# Patient Record
Sex: MALE | Race: WHITE | NOT HISPANIC OR LATINO
[De-identification: names, ages, dates, MRNs, and addresses within clinical notes are randomized per-mention and may not be internally consistent; named-entity substitution may affect disease eponyms.]

---
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
## 2022-08-23 ENCOUNTER — APPOINTMENT (OUTPATIENT)
Dept: ORTHOPEDIC SURGERY | Facility: CLINIC | Age: 22
End: 2022-08-23

## 2022-08-23 VITALS — BODY MASS INDEX: 25.06 KG/M2 | WEIGHT: 185 LBS | HEIGHT: 72 IN

## 2022-08-23 DIAGNOSIS — S83.512A SPRAIN OF ANTERIOR CRUCIATE LIGAMENT OF LEFT KNEE, INITIAL ENCOUNTER: ICD-10-CM

## 2022-08-23 DIAGNOSIS — Z78.9 OTHER SPECIFIED HEALTH STATUS: ICD-10-CM

## 2022-08-23 PROCEDURE — 73610 X-RAY EXAM OF ANKLE: CPT | Mod: LT

## 2022-08-23 PROCEDURE — 99204 OFFICE O/P NEW MOD 45 MIN: CPT

## 2022-08-23 NOTE — ASSESSMENT
[FreeTextEntry1] : m[facility] from [facility] shows chronci sprains and ol avulsion injury off talus\par \par ossicle dorsal to taus [id number] to ankle impinge\par \par \par - The patient was advised of the diagnosis.  The natural history of the pathology was explained to the patient in layman's terms.  Several different treatment options were discussed and explained including the risks and benefits of both surgical and non-surgical treatments.  All questions and concerns were answered.\par - We will continue conservative treatment with PT, icing, and anti-inflammatory medications.\par - fu Fidelia discuss poss surgery if not better (leaving for college for lax)\par \par \par Medication Discussion:\par 1) We discussed a comprehensive treatment plan that included possible pharmaceutical management involving the use of prescription strength medications including but not limited to options such as oral Naprosyn 500mg BID, once daily Meloxicam 15 mg, or 500-650 mg Tylenol versus over the counter oral medications in addition to discussing possible topical prescription Pennsaid vs  Voltaren gel.\par 2) There is a moderate risk of morbidity with further treatment, especially from use of prescription strength medications and possible side effects of these medications which include but are not limited to upset stomach with oral medications, skin reactions to topical medications and GI/cardiac/renal issues with long term use.\par 3) I recommended that the patient follow-up with their medical physician to discuss any significant specific potential issues with long term medication use such as interactions with current medications or with exacerbation of underlying medical comorbidities.\par 4) The benefits and risks associated with use of oral and / or topical prescription and over the counter anti-inflammatory medications were discussed with the patient. The patient voiced understanding of the risks including but not limited to bleeding, stroke, kidney dysfunction, heart disease, and were referred to the black box warning label for further information.\par

## 2022-08-23 NOTE — DATA REVIEWED
[MRI] : MRI [Left] : left [I independently reviewed and interpreted images and report] : I independently reviewed and interpreted images and report

## 2022-08-23 NOTE — HISTORY OF PRESENT ILLNESS
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

## 2022-08-23 NOTE — IMAGING
[de-identified] : \par LEFT /ANKLE and FOOT\par Inspection:  mild swelling\par Palpation: ant talus and lateral ligament tenderness\par Knee and Ankle Range of Motion: normal but with pain\par Strength: dec due to pain\par Neurovascular intact distally\par Gait: antalgic\par  [Left] : left ankle [There are no fractures, subluxations or dislocations. No significant abnormalities are seen] : There are no fractures, subluxations or dislocations. No significant abnormalities are seen [FreeTextEntry9] : ossicle dorsal to tlaus

## 2022-08-24 ENCOUNTER — APPOINTMENT (OUTPATIENT)
Dept: ORTHOPEDIC SURGERY | Facility: CLINIC | Age: 22
End: 2022-08-24

## 2022-08-24 VITALS — WEIGHT: 185 LBS | BODY MASS INDEX: 25.06 KG/M2 | HEIGHT: 72 IN

## 2022-08-24 DIAGNOSIS — Z78.9 OTHER SPECIFIED HEALTH STATUS: ICD-10-CM

## 2022-08-24 PROCEDURE — 99214 OFFICE O/P EST MOD 30 MIN: CPT | Mod: 57

## 2022-08-24 NOTE — HISTORY OF PRESENT ILLNESS
[Sports related] : sports related [Sudden] : sudden [8] : 8 [Localized] : localized [Stabbing] : stabbing [Nothing helps with pain getting better] : Nothing helps with pain getting better [Student] : Work status: student [] : no [FreeTextEntry1] : left ankle [FreeTextEntry5] : hurt ankle back in March 2022 but kept playing and running on it; pt plays [employer]...For the past 2 weeks, pain has been getting worse [FreeTextEntry9] : brace,advil [de-identified] : stepping wrong way [de-identified] : 8-23-22 [de-identified] : Dr. Horton [de-identified] : xr OCMSG and [facility]

## 2022-08-24 NOTE — DISCUSSION/SUMMARY
[Surgical risks reviewed] : Surgical risks reviewed [de-identified] : The risks, benefits, alternatives have been discussed.  The risks include but are not limited to infection, bleeding, injury to small nerves and blood vessels, pain, stiffness, progression, dvt, PE, amputation and death.\par

## 2022-08-24 NOTE — PHYSICAL EXAM
[Left] : left foot and ankle [NL (40)] : plantar flexion 40 degrees [] : negative anterior drawer at ankle [TWNoteComboBox7] : dorsiflexion 10 degrees

## 2022-09-15 ENCOUNTER — NON-APPOINTMENT (OUTPATIENT)
Age: 22
End: 2022-09-15

## 2022-09-15 RX ORDER — HYDROCODONE BITARTRATE AND ACETAMINOPHEN 5; 325 MG/1; MG/1
5-325 TABLET ORAL
Qty: 20 | Refills: 0 | Status: ACTIVE | COMMUNITY
Start: 2022-09-15 | End: 1900-01-01

## 2022-09-19 ENCOUNTER — APPOINTMENT (OUTPATIENT)
Age: 22
End: 2022-09-19

## 2022-09-19 ENCOUNTER — RESULT REVIEW (OUTPATIENT)
Age: 22
End: 2022-09-19

## 2022-09-19 PROCEDURE — 20650 INSERT AND REMOVE BONE PIN: CPT | Mod: LT

## 2022-09-19 PROCEDURE — 29894 ANKLE ARTHROSCOPY/SURGERY: CPT | Mod: 59,LT

## 2022-09-19 PROCEDURE — 29898 ANKLE ARTHROSCOPY/SURGERY: CPT | Mod: LT

## 2022-09-19 PROCEDURE — 27640 PARTIAL REMOVAL OF TIBIA: CPT | Mod: LT

## 2022-09-30 ENCOUNTER — APPOINTMENT (OUTPATIENT)
Dept: ORTHOPEDIC SURGERY | Facility: CLINIC | Age: 22
End: 2022-09-30

## 2022-09-30 VITALS — BODY MASS INDEX: 25.06 KG/M2 | WEIGHT: 185 LBS | HEIGHT: 72 IN

## 2022-09-30 DIAGNOSIS — M25.879 OTHER SPECIFIED JOINT DISORDERS, UNSPECIFIED ANKLE AND FOOT: ICD-10-CM

## 2022-09-30 DIAGNOSIS — M24.072 LOOSE BODY IN LEFT ANKLE: ICD-10-CM

## 2022-09-30 DIAGNOSIS — M24.075 LOOSE BODY IN LEFT ANKLE: ICD-10-CM

## 2022-09-30 PROCEDURE — 99024 POSTOP FOLLOW-UP VISIT: CPT

## 2022-09-30 PROCEDURE — 73600 X-RAY EXAM OF ANKLE: CPT | Mod: LT

## 2022-09-30 NOTE — PHYSICAL EXAM
[Left] : left foot and ankle [NL (40)] : plantar flexion 40 degrees [2+] : posterior tibialis pulse: 2+ [] : negative anterior drawer at ankle [TWNoteComboBox7] : dorsiflexion 15 degrees

## 2022-09-30 NOTE — HISTORY OF PRESENT ILLNESS
[Dull/Aching] : dull/aching [Localized] : localized [Student] : Work status: student [Sports related] : sports related [Sudden] : sudden [8] : 8 [Stabbing] : stabbing [Nothing helps with pain getting better] : Nothing helps with pain getting better [de-identified] : \par \par 9/19/22  L ankle arthroscopic debridement, extensive, removal of loose body, 11mm, Tibial exostectomy.\par \par 9/22/22  here for first post op L ankle, with his Dad, doing well. No f/c/s.  Using crutches.  [] : This patient has had an injection before: no [FreeTextEntry1] : left ankle [FreeTextEntry5] : hurt ankle back in March 2022 but kept playing and running on it; pt plays [employer]...For the past 2 weeks, pain has been getting worse [FreeTextEntry9] : brace,advil [de-identified] : stepping wrong way [de-identified] : 8-23-22 [de-identified] : Dr. Horton [de-identified] : xr OCMSG and [facility] [de-identified] : None [de-identified] : 9/19/22 [de-identified] :  L ankle Arthroscopic debridement loose body removal and tibial exostectomy

## 2022-09-30 NOTE — DISCUSSION/SUMMARY
[de-identified] : surgical findings and images reviewed with him and his Dad\par transition to sneaker\par d/c crutches when able, wbat, motion as reviewed\par start PT\par wound care

## 2023-12-27 ENCOUNTER — APPOINTMENT (OUTPATIENT)
Dept: MRI IMAGING | Facility: CLINIC | Age: 23
End: 2023-12-27
Payer: COMMERCIAL

## 2023-12-27 ENCOUNTER — APPOINTMENT (OUTPATIENT)
Dept: ORTHOPEDIC SURGERY | Facility: CLINIC | Age: 23
End: 2023-12-27
Payer: COMMERCIAL

## 2023-12-27 VITALS — WEIGHT: 185 LBS | HEIGHT: 73 IN | BODY MASS INDEX: 24.52 KG/M2

## 2023-12-27 DIAGNOSIS — M54.16 RADICULOPATHY, LUMBAR REGION: ICD-10-CM

## 2023-12-27 PROCEDURE — 99214 OFFICE O/P EST MOD 30 MIN: CPT | Mod: 25

## 2023-12-27 PROCEDURE — 72170 X-RAY EXAM OF PELVIS: CPT

## 2023-12-27 PROCEDURE — 72100 X-RAY EXAM L-S SPINE 2/3 VWS: CPT

## 2023-12-27 PROCEDURE — 99204 OFFICE O/P NEW MOD 45 MIN: CPT | Mod: 25

## 2023-12-27 PROCEDURE — 72148 MRI LUMBAR SPINE W/O DYE: CPT

## 2023-12-27 RX ORDER — METHYLPREDNISOLONE 4 MG/1
4 TABLET ORAL
Qty: 1 | Refills: 0 | Status: ACTIVE | COMMUNITY
Start: 2023-12-27 | End: 1900-01-01

## 2023-12-27 NOTE — ASSESSMENT
[FreeTextEntry1] : X-Ray Examination of the LUMBAR SPINE 2 views shows: no abnormalities  X-Ray Examination of the PELVIS 1 or 2 views shows: there are no fractures, subluxations or dislocations.   - We discussed their diagnosis and treatment options at length including the risks and benefits of both surgical and non-surgical options.. - We will continue conservative treatment with a course of PT and anti-inflammatory medication. - The patient was provided with a prescription for Physical Therapy. - Rx given for Medrol dose pack - Discussed the possible side effects of medication along with the timing and frequency for taking. - will get mri L spine to r/o HNP or stress fx - follow up after mri

## 2023-12-27 NOTE — HISTORY OF PRESENT ILLNESS
[de-identified] : 23 year old male  (  [employer] lax)  lower back pain since 10/2023  when playing lax The pain is located  posterior, lateral  The pain is associated with  weakness, radiation Worse with activity and better at rest. Has tried ice, ibuprofen

## 2023-12-27 NOTE — IMAGING
[de-identified] : Back / Spine: Inspection: No erythema and ecchymosis. Palpation: left lumbar paraspinal tenderness.  Range of motion:  Near full range of motion but with mild stiffness. Pain with lateral rotation. pain at extremes of flexion Strength Testing: Weakness with left ankle dorsiflexion and EHL strength Otherwise 5/5 throughout both lower extremities with normal tone  Neurological testing: light touch is intact throughout both lower extremities Straight Leg Raise: positive left Babiniski test: neg

## 2023-12-28 ENCOUNTER — APPOINTMENT (OUTPATIENT)
Dept: ORTHOPEDIC SURGERY | Facility: CLINIC | Age: 23
End: 2023-12-28
Payer: COMMERCIAL

## 2023-12-29 PROBLEM — M79.18 MUSCULOSKELETAL PAIN: Status: ACTIVE | Noted: 2022-08-23

## 2023-12-29 PROBLEM — S39.012A STRAIN OF LUMBAR REGION, INITIAL ENCOUNTER: Status: ACTIVE | Noted: 2023-12-27

## 2024-01-02 ENCOUNTER — APPOINTMENT (OUTPATIENT)
Dept: ORTHOPEDIC SURGERY | Facility: CLINIC | Age: 24
End: 2024-01-02
Payer: COMMERCIAL

## 2024-01-02 DIAGNOSIS — M79.18 MYALGIA, OTHER SITE: ICD-10-CM

## 2024-01-02 DIAGNOSIS — S39.012A STRAIN OF MUSCLE, FASCIA AND TENDON OF LOWER BACK, INITIAL ENCOUNTER: ICD-10-CM

## 2024-01-02 PROCEDURE — 99213 OFFICE O/P EST LOW 20 MIN: CPT

## 2024-01-02 NOTE — HISTORY OF PRESENT ILLNESS
[de-identified] : 23 year old male  (  [employer])  lower back pain since 10/2023  when playing lax The pain is located  posterior, lateral  The pain is associated with  weakness, radiation Worse with activity and better at rest. Has tried ice, ibuprofen   1/2/24 - had mri showing no HNP stil back tightness and pain

## 2024-01-02 NOTE — ASSESSMENT
[FreeTextEntry1] : mri L spine 12/27/23 - no HNP, normal  - We discussed their diagnosis and treatment options at length including the risks and benefits of both surgical and non-surgical options.. - We will continue conservative treatment with a course of PT and anti-inflammatory medication. - The patient was provided with a prescription for Physical Therapy. - Rx given for Medrol dose pack - Discussed the possible side effects of medication along with the timing and frequency for taking. - The patient was advised to let pain guide the gradual advancement of activities.

## 2024-01-02 NOTE — IMAGING
[de-identified] : Back / Spine: Inspection: No erythema and ecchymosis. Palpation: b/l lumbar paraspinal tenderness.  Range of motion:  Near full range of motion but with mild stiffness.  Strength Testing: motor exam is 5/5 throughout both lower extremities with normal tone Neurological testing: light touch is intact throughout both lower extremities Straight Leg Raise: neg Babiniski test: neg bilaterally

## 2024-05-24 ENCOUNTER — NON-APPOINTMENT (OUTPATIENT)
Age: 24
End: 2024-05-24